# Patient Record
(demographics unavailable — no encounter records)

---

## 2025-07-30 NOTE — PHYSICAL EXAM
[Appropriately responsive] : appropriately responsive [Alert] : alert [No Acute Distress] : no acute distress [No Lymphadenopathy] : no lymphadenopathy [Soft] : soft [Non-tender] : non-tender [Non-distended] : non-distended [No HSM] : No HSM [No Lesions] : no lesions [No Mass] : no mass [Oriented x3] : oriented x3 [Examination Of The Breasts] : a normal appearance [No Discharge] : no discharge [No Masses] : no breast masses were palpable [Labia Majora] : normal [Labia Minora] : normal [Normal] : normal [Absent] : absent [Uterine Adnexae] : normal [Normal rectal exam] : was normal [FreeTextEntry2] : Nicole PEDRO chaperoned during entire physical exam, [Occult Blood Positive] : was negative for occult blood analysis

## 2025-07-30 NOTE — HISTORY OF PRESENT ILLNESS
[TextBox_4] : 49 yo female here today for annual visit. She is s/p [facility] in december 2023. denies any GYN complaints at this time

## 2025-07-30 NOTE — HISTORY OF PRESENT ILLNESS
[TextBox_4] : 47 yo female here today for annual visit. She is s/p [facility] in december 2023. denies any GYN complaints at this time

## 2025-07-30 NOTE — PLAN
[FreeTextEntry1] : Patient to follow up in 1 year for annual GYN exam Mammogram: now RX GIVEN  Colonoscopy due: tika appt for 11/21/25 Bone density due:  55  Pap ordered Hemoccult ordered  All questions answered, patient agreeable with plan.  I Nicole PEDRO am scribing for the presence of Dr. Hubbard the following sections HISTORY OF PRESENT ILLNESS, PAST MEDICAL/FAMILY/SOCIAL HISTORY; REVIEW OF SYSTEMS; VITAL SIGNS; PHYSICAL EXAM; DISPOSITION.   I personally performed the services described in the documentation, reviewed the documentation recorded by the scribe in my presence and it accurately and completely records my words and actions.